# Patient Record
Sex: MALE | Race: WHITE
[De-identification: names, ages, dates, MRNs, and addresses within clinical notes are randomized per-mention and may not be internally consistent; named-entity substitution may affect disease eponyms.]

---

## 2018-09-03 ENCOUNTER — HOSPITAL ENCOUNTER (OUTPATIENT)
Dept: HOSPITAL 97 - ER | Age: 43
Setting detail: OBSERVATION
LOS: 1 days | Discharge: HOME | End: 2018-09-04
Attending: SURGERY | Admitting: SURGERY
Payer: SELF-PAY

## 2018-09-03 VITALS — BODY MASS INDEX: 35.5 KG/M2

## 2018-09-03 DIAGNOSIS — S01.81XA: Primary | ICD-10-CM

## 2018-09-03 DIAGNOSIS — Z88.0: ICD-10-CM

## 2018-09-03 DIAGNOSIS — M25.511: ICD-10-CM

## 2018-09-03 DIAGNOSIS — S06.0X0A: ICD-10-CM

## 2018-09-03 DIAGNOSIS — M79.652: ICD-10-CM
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
## 2018-09-03 DIAGNOSIS — Z23: ICD-10-CM

## 2018-09-03 LAB
ALBUMIN SERPL BCP-MCNC: 4 G/DL (ref 3.4–5)
ALP SERPL-CCNC: 78 U/L (ref 45–117)
ALT SERPL W P-5'-P-CCNC: 41 U/L (ref 12–78)
AST SERPL W P-5'-P-CCNC: 26 U/L (ref 15–37)
BUN BLD-MCNC: 26 MG/DL (ref 7–18)
CKMB CREATINE KINASE MB: 1.3 NG/ML (ref 0.3–3.6)
GLUCOSE SERPLBLD-MCNC: 111 MG/DL (ref 74–106)
HCT VFR BLD CALC: 42.6 % (ref 39.6–49)
INR BLD: 0.92
LYMPHOCYTES # SPEC AUTO: 4.1 K/UL (ref 0.7–4.9)
MAGNESIUM SERPL-MCNC: 2.3 MG/DL (ref 1.8–2.4)
MCH RBC QN AUTO: 32.5 PG (ref 27–35)
MCV RBC: 94.1 FL (ref 80–100)
METHAMPHET UR QL SCN: NEGATIVE
NT-PROBNP SERPL-MCNC: 63 PG/ML (ref ?–125)
PMV BLD: 9.1 FL (ref 7.6–11.3)
POTASSIUM SERPL-SCNC: 4.2 MMOL/L (ref 3.5–5.1)
RBC # BLD: 4.52 M/UL (ref 4.33–5.43)
THC SERPL-MCNC: NEGATIVE NG/ML
TROPONIN (EMERG DEPT USE ONLY): < 0.02 NG/ML (ref 0–0.04)

## 2018-09-03 PROCEDURE — 70450 CT HEAD/BRAIN W/O DYE: CPT

## 2018-09-03 PROCEDURE — 80061 LIPID PANEL: CPT

## 2018-09-03 PROCEDURE — 86850 RBC ANTIBODY SCREEN: CPT

## 2018-09-03 PROCEDURE — 82553 CREATINE MB FRACTION: CPT

## 2018-09-03 PROCEDURE — 93005 ELECTROCARDIOGRAM TRACING: CPT

## 2018-09-03 PROCEDURE — 96374 THER/PROPH/DIAG INJ IV PUSH: CPT

## 2018-09-03 PROCEDURE — 85610 PROTHROMBIN TIME: CPT

## 2018-09-03 PROCEDURE — 71045 X-RAY EXAM CHEST 1 VIEW: CPT

## 2018-09-03 PROCEDURE — 85730 THROMBOPLASTIN TIME PARTIAL: CPT

## 2018-09-03 PROCEDURE — 83735 ASSAY OF MAGNESIUM: CPT

## 2018-09-03 PROCEDURE — 82550 ASSAY OF CK (CPK): CPT

## 2018-09-03 PROCEDURE — 96361 HYDRATE IV INFUSION ADD-ON: CPT

## 2018-09-03 PROCEDURE — 71260 CT THORAX DX C+: CPT

## 2018-09-03 PROCEDURE — 99292 CRITICAL CARE ADDL 30 MIN: CPT

## 2018-09-03 PROCEDURE — 85025 COMPLETE CBC W/AUTO DIFF WBC: CPT

## 2018-09-03 PROCEDURE — 0JQ10ZZ REPAIR FACE SUBCUTANEOUS TISSUE AND FASCIA, OPEN APPROACH: ICD-10-PCS

## 2018-09-03 PROCEDURE — 72170 X-RAY EXAM OF PELVIS: CPT

## 2018-09-03 PROCEDURE — 36415 COLL VENOUS BLD VENIPUNCTURE: CPT

## 2018-09-03 PROCEDURE — 74177 CT ABD & PELVIS W/CONTRAST: CPT

## 2018-09-03 PROCEDURE — 70140 X-RAY EXAM OF FACIAL BONES: CPT

## 2018-09-03 PROCEDURE — 83880 ASSAY OF NATRIURETIC PEPTIDE: CPT

## 2018-09-03 PROCEDURE — 86901 BLOOD TYPING SEROLOGIC RH(D): CPT

## 2018-09-03 PROCEDURE — 81003 URINALYSIS AUTO W/O SCOPE: CPT

## 2018-09-03 PROCEDURE — 72125 CT NECK SPINE W/O DYE: CPT

## 2018-09-03 PROCEDURE — 80307 DRUG TEST PRSMV CHEM ANLYZR: CPT

## 2018-09-03 PROCEDURE — 99291 CRITICAL CARE FIRST HOUR: CPT

## 2018-09-03 PROCEDURE — 90714 TD VACC NO PRESV 7 YRS+ IM: CPT

## 2018-09-03 PROCEDURE — 80048 BASIC METABOLIC PNL TOTAL CA: CPT

## 2018-09-03 PROCEDURE — 84484 ASSAY OF TROPONIN QUANT: CPT

## 2018-09-03 PROCEDURE — 80320 DRUG SCREEN QUANTALCOHOLS: CPT

## 2018-09-03 PROCEDURE — 70553 MRI BRAIN STEM W/O & W/DYE: CPT

## 2018-09-03 PROCEDURE — 80076 HEPATIC FUNCTION PANEL: CPT

## 2018-09-03 PROCEDURE — 86900 BLOOD TYPING SEROLOGIC ABO: CPT

## 2018-09-03 RX ADMIN — Medication SCH ML: at 21:25

## 2018-09-03 RX ADMIN — HYDROCODONE BITARTRATE AND ACETAMINOPHEN PRN TAB: 5; 325 TABLET ORAL at 21:23

## 2018-09-03 RX ADMIN — SODIUM CHLORIDE, SODIUM LACTATE, POTASSIUM CHLORIDE, AND CALCIUM CHLORIDE SCH MLS: .6; .31; .03; .02 INJECTION, SOLUTION INTRAVENOUS at 17:27

## 2018-09-03 RX ADMIN — HYDROCODONE BITARTRATE AND ACETAMINOPHEN PRN TAB: 5; 325 TABLET ORAL at 17:27

## 2018-09-03 NOTE — RAD REPORT
EXAM DESCRIPTION:  RAD - Femur Left - 9/3/2018 3:16 pm

 

CLINICAL HISTORY:  Left leg pain

 

FINDINGS:  . No fracture is seen.
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
## 2018-09-03 NOTE — RAD REPORT
EXAM DESCRIPTION:  Rylee Single View9/3/2018 3:07 pm

 

CLINICAL HISTORY:  CHEST PAIN

 

COMPARISON:  none

 

FINDINGS:   The lungs appear clear of acute infiltrate. The heart is borderline enlarged

 

IMPRESSION:   No acute abnormalities displayed transfer from [facility]/Other

## 2018-09-03 NOTE — RAD REPORT
EXAM DESCRIPTION:  RAD - Shoulder  Right 2 View - 9/3/2018 3:09 pm

 

CLINICAL HISTORY:  Right shoulder pain

 

FINDINGS:  No fracture or dislocation is seen.

## 2018-09-03 NOTE — RAD REPORT
EXAM DESCRIPTION:

RAD - Facial Bones  <3 Views - 9/3/2018 5:03 pm

 

CLINICAL HISTORY:  check for any glass< facial pain

 

FINDINGS:  A 6 millimeter curvilinear density overlies the subcutaneous tissues of the right face. Th
is is equivocal for a foreign body

## 2018-09-03 NOTE — RAD REPORT
EXAM DESCRIPTION:  RAD - Pelvis - 9/3/2018 3:07 pm

 

CLINICAL HISTORY:  Pelvic pain status post injury

 

FINDINGS:  No fracture or dislocation is seen.

## 2018-09-03 NOTE — EDPHYS
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     

      right shoulder; Pain in left thigh. Bed requested for Telemetry/MedSurg (observation).      

      Status is Observation. Condition is Fair. Problem is new. Symptoms have improved. UTI       

      on Admission? No. em1                                                                       

16:55 16:55 2018 16:12 Hospitalization Ordered by Neil Alamo MD for Observation.       genevieve 

      Preliminary diagnosis is Laceration without foreign body of other part of head - skin       

      avulsiond, foreign bodies removed; Pain in right shoulder; Pain in left thigh. Bed          

      requested for Telemetry/MedSurg (observation). Status is Observation. Condition is          

      Fair. Problem is new. Symptoms have improved. UTI on Admission? No. genevieve                     

17:20 16:55 2018 16:12 Hospitalization Ordered by Neil Alamo MD for Observation.       sv  

      Preliminary diagnosis is Laceration without foreign body of other part of head - skin       

      avulsiond, foreign bodies removed; Pain in right shoulder; Pain in left thigh;              

      Concussion without loss of consciousness. Bed requested for Telemetry/MedSurg               

      (observation). Status is Observation. Condition is Fair. Problem is new. Symptoms have      

      improved. UTI on Admission? No. genevieve                                                         

                                                                                                  

**************************************************************************************************
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
## 2018-09-04 VITALS — OXYGEN SATURATION: 95 %

## 2018-09-04 VITALS — DIASTOLIC BLOOD PRESSURE: 78 MMHG | TEMPERATURE: 98.3 F | SYSTOLIC BLOOD PRESSURE: 146 MMHG

## 2018-09-04 LAB
HDLC SERPL-MCNC: 30 MG/DL (ref 40–60)
LDLC SERPL CALC-MCNC: 67 MG/DL (ref ?–130)

## 2018-09-04 RX ADMIN — SODIUM CHLORIDE, SODIUM LACTATE, POTASSIUM CHLORIDE, AND CALCIUM CHLORIDE SCH MLS: .6; .31; .03; .02 INJECTION, SOLUTION INTRAVENOUS at 03:10

## 2018-09-04 RX ADMIN — SODIUM CHLORIDE, SODIUM LACTATE, POTASSIUM CHLORIDE, AND CALCIUM CHLORIDE SCH: .6; .31; .03; .02 INJECTION, SOLUTION INTRAVENOUS at 08:00

## 2018-09-04 RX ADMIN — Medication SCH: at 09:00

## 2018-09-04 RX ADMIN — HYDROCODONE BITARTRATE AND ACETAMINOPHEN PRN TAB: 5; 325 TABLET ORAL at 03:07

## 2018-09-04 RX ADMIN — HYDROCODONE BITARTRATE AND ACETAMINOPHEN PRN TAB: 5; 325 TABLET ORAL at 11:52
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
## 2018-09-04 NOTE — RAD REPORT
EXAM DESCRIPTION:  MRI - Shoulder Rt Wo Cont - 9/4/2018 10:37 am

 

CLINICAL HISTORY:  unable to move rt arm

Trauma, shoulder pain

 

COMPARISON:  No comparisons

 

FINDINGS:  The AC joint demonstrates mild to moderate osseous and capsular hypertrophy. The acromion 
process is mildly concave within 8 mm acromial spur present.

 

The rotator cuff demonstrates a large full-thickness tear of the supraspinatus tendon measuring 17 mm
. Remnant tendon is all retracted but remains lateral to the AC joint. Significant partial-thickness 
tearing also noted involving the distal infraspinatus tendon and subscapularis tendon.

 

Edema is present surrounding the rotator cuff musculature. The biceps tendon lies within the bicipita
l groove.

 

The posterior labrum appears detached from the glenoid.

 

An acute fracture or dislocation is not seen. Floyd-Sachs lesion suspected.

 

Mild joint fluid is noted.

 

 

IMPRESSION:  Large rotator cuff tear is present. Fluid and edema is seen surrounding the rotator cuff
 musculature.

 

Detachment of the posterior labrum from the glenoid, age undetermined, but likely related to reverse 
Bankart lesion.

## 2018-09-04 NOTE — EKG
Test Date:    2018-09-03               Test Time:    15:44:42

Technician:   YARELI                                     

                                                     

MEASUREMENT RESULTS:                                       

Intervals:                                           

Rate:         76                                     

ND:           156                                    

QRSD:         96                                     

QT:           388                                    

QTc:          436                                    

Axis:                                                

P:            49                                     

ND:           156                                    

QRS:          -7                                     

T:            52                                     

                                                     

INTERPRETIVE STATEMENTS:                                       

                                                     

Normal sinus rhythm

Normal ECG

Compared to ECG 07/13/2015 15:25:07

No significant changes



Electronically Signed On 09-04-18 12:19:17 CDT by Sameer Marroquin

## 2018-09-12 ENCOUNTER — HOSPITAL ENCOUNTER (EMERGENCY)
Dept: HOSPITAL 97 - ER | Age: 43
Discharge: HOME | End: 2018-09-12
Payer: SELF-PAY

## 2018-09-12 VITALS — DIASTOLIC BLOOD PRESSURE: 78 MMHG | TEMPERATURE: 98.2 F | OXYGEN SATURATION: 99 % | SYSTOLIC BLOOD PRESSURE: 130 MMHG

## 2018-09-12 DIAGNOSIS — Z48.02: Primary | ICD-10-CM

## 2018-09-12 NOTE — ER
Nurse's Notes                                                                                     

 [facility]                                                                

Name: Roel Hatfield                                                                              

Age: 43 yrs                                                                                       

Sex: Male                                                                                         

: 1975                                                                                   

MRN: [id number]                                                                                   

Arrival Date: 2018                                                                          

Time: 15:27                                                                                       

Account#: [id number]                                                                            

Bed 12                                                                                            

Private MD: Kostas Salas T                                                                        

Diagnosis: Encounter for removal of sutures                                                       

                                                                                                  

Presentation:                                                                                     

                                                                                             

15:46 Presenting complaint: Patient states: suture removal, placed on 9/3/18. Transition of   sv  

      care: patient was not received from another setting of care. Onset of symptoms was          

      September 3, 2018. Risk Assessment: Do you want to hurt yourself or someone else?           

      Patient reports no desire to harm self or others. Initial Sepsis Screen: Does the           

      patient meet any 2 criteria? No. Patient's initial sepsis screen is negative. Does the      

      patient have a suspected source of infection? No. Patient's initial sepsis screen is        

      negative. Care prior to arrival: None.                                                      

15:46 Method Of Arrival: Ambulatory                                                           sv  

15:46 Acuity: BRADEN 5                                                                           sv  

                                                                                                  

Triage Assessment:                                                                                

15:47 General: Appears in no apparent distress. comfortable, well developed, Behavior is      sv  

      calm, cooperative, appropriate for age. Pain: Denies pain. EENT: No signs and/or            

      symptoms were reported regarding the EENT system. Neuro: Level of Consciousness is          

      awake, alert, obeys commands, Oriented to person, place, time, situation, Moves all         

      extremities. Full function Gait is steady, Speech is normal. Respiratory: Respiratory       

      effort is even, unlabored, Respiratory pattern is regular, symmetrical. Derm: Skin is       

      pink, warm \T\ dry. Injury Description: Laceration sustained to right forehead is 0.5 to    

      2.5 cm long, not bleeding, was sustained 9/3/18 no active bleeding noted at this time.      

                                                                                                  

Historical:                                                                                       

- Allergies:                                                                                      

15:47 Amoxicillin;                                                                            sv  

- PMHx:                                                                                           

15:47 Anxiety; Depression;                                                                    sv  

- PSHx:                                                                                           

15:47 left wrist; left foot;                                                                  sv  

                                                                                                  

- Immunization history:: Adult Immunizations up to date.                                          

- Social history:: Smoking status: Patient uses tobacco products, smokes one-half pack            

  cigarettes per day.                                                                             

- Ebola Screening: : No symptoms or risks identified at this time.                                

                                                                                                  

                                                                                                  

Screening:                                                                                        

15:48 Abuse screen: Denies threats or abuse. Denies injuries from another. Nutritional        sv  

      screening: No deficits noted. Tuberculosis screening: No symptoms or risk factors           

      identified. Fall Risk None identified.                                                      

                                                                                                  

Assessment:                                                                                       

15:48 Reassessment: Patient appears in no apparent distress at this time. No changes from       

      previously documented assessment. Patient and/or family updated on plan of care and         

      expected duration. Pain level reassessed. Patient is alert, oriented x 3, equal             

      unlabored respirations, skin warm/dry/pink.                                                 

                                                                                                  

Vital Signs:                                                                                      

15:47  / 78; Pulse 89; Resp 18; Temp 98.2; Pulse Ox 99% ;                               sv  

                                                                                                  

ED Course:                                                                                        

15:27 Patient arrived in ED.                                                                  sb2 

15:27 Kostas Salas MD is Private Physician.                                                   sb2 

15:42 Katlyn Norman FNP-C is [facility].                                                        kb  

15:42 En Palacios MD is Attending Physician.                                           kb  

15:46 Triage completed.                                                                       sv  

15:47 Arm band placed on right wrist.                                                         sv  

15:48 Patient has correct armband on for positive identification.                             sv  

15:48 Patient did not have IV access during this emergency room visit.                        sv  

15:49 Suture removal.                                                                         sv  

                                                                                                  

Administered Medications:                                                                         

No medications were administered                                                                  

                                                                                                  

                                                                                                  

Outcome:                                                                                          

15:49 Discharged to home ambulatory, with family.                                             sv  

15:49 Condition: stable                                                                           

15:49 No charge visit due to suture removal.                                                      

15:51 Discharge ordered by MD.                                                                kb  

16:03 Patient left the ED.                                                                      

                                                                                                  

Signatures:                                                                                       

Katlyn Norman FNP-C FNP-Ckb Verde, Stephanie, RN                    RN                                                      

Diana Obando RN                      RN                                                      

Sahara Henderson                               sb2                                                  

                                                                                                  

**************************************************************************************************

## 2018-09-12 NOTE — EDPHYS
Physician Documentation                                                                           

 [facility]                                                                

Name: Roel Hatfield                                                                              

Age: 43 yrs                                                                                       

Sex: Male                                                                                         

: 1975                                                                                   

MRN: [id number]                                                                                   

Arrival Date: 2018                                                                          

Time: 15:27                                                                                       

Account#: [id number]                                                                            

Bed 12                                                                                            

Private MD: Kostas Salas T                                                                        

ED Physician En Palacios                                                                    

HPI:                                                                                              

                                                                                             

15:52 This 43 yrs old  Male presents to ER via Ambulatory with complaints of Suture  kb  

      Removal.                                                                                    

15:52 The patient has sutures on the right temple and top of head. Previous treatment: The    kb  

      patient was initially treated on 2018, the care was rendered at [facility]. Sutures/staples progress: The patient has no c/o's. The wound       

      is well-healing with no redness, swelling, discharge, or dehiscence reported. The           

      patient has not experienced similar symptoms in the past. The patient has been recently     

      seen at the [facility] Emergency Department, a couple of weeks       

      ago.                                                                                        

                                                                                                  

Historical:                                                                                       

- Allergies:                                                                                      

15:47 Amoxicillin;                                                                            sv  

- PMHx:                                                                                           

15:47 Anxiety; Depression;                                                                    sv  

- PSHx:                                                                                           

15:47 left wrist; left foot;                                                                  sv  

                                                                                                  

- Immunization history:: Adult Immunizations up to date.                                          

- Social history:: Smoking status: Patient uses tobacco products, smokes one-half pack            

  cigarettes per day.                                                                             

- Ebola Screening: : No symptoms or risks identified at this time.                                

                                                                                                  

                                                                                                  

ROS:                                                                                              

15:51 Constitutional: Negative for fever, chills, and weight loss, Cardiovascular: Negative   kb  

      for chest pain, palpitations, and edema, Respiratory: Negative for shortness of breath,     

      cough, wheezing, and pleuritic chest pain, Abdomen/GI: Negative for abdominal pain,         

      nausea, vomiting, diarrhea, and constipation, Back: Negative for injury and pain, :       

      Negative for injury, bleeding, discharge, and swelling, MS/Extremity: Negative for          

      injury and deformity, Neuro: Negative for headache, weakness, numbness, tingling, and       

      seizure.                                                                                    

15:51 Skin: Positive for of the right temple and top of head, sutures in place.                   

                                                                                                  

Exam:                                                                                             

15:51 Constitutional:  This is a well developed, well nourished patient who is awake, alert,  kb  

      and in no acute distress. Head/Face:  Normocephalic, atraumatic. Chest/axilla:  Normal      

      chest wall appearance and motion.  Nontender with no deformity.  No lesions are             

      appreciated. Cardiovascular:  Regular rate and rhythm with a normal S1 and S2.  No          

      gallops, murmurs, or rubs.  Normal PMI, no JVD.  No pulse deficits. Respiratory:  Lungs     

      have equal breath sounds bilaterally, clear to auscultation and percussion.  No rales,      

      rhonchi or wheezes noted.  No increased work of breathing, no retractions or nasal          

      flaring. Abdomen/GI:  Soft, non-tender, with normal bowel sounds.  No distension or         

      tympany.  No guarding or rebound.  No evidence of tenderness throughout. MS/ Extremity:     

       Pulses equal, no cyanosis.  Neurovascular intact.  Full, normal range of motion.           

      Neuro:  Awake and alert, GCS 15, oriented to person, place, time, and situation.            

      Cranial nerves II-XII grossly intact.  Motor strength 5/5 in all extremities.  Sensory      

      grossly intact.  Cerebellar exam normal.  Normal gait.                                      

15:51 Skin: Wound recheck: Suture laceration closure: the wound is healing well, the edges        

      are well approximated, no evidence of dehiscence, no drainage, no erythema, no swelling.    

                                                                                                  

Vital Signs:                                                                                      

15:47  / 78; Pulse 89; Resp 18; Temp 98.2; Pulse Ox 99% ;                               sv  

                                                                                                  

Procedures:                                                                                       

15:50 Suture/Staple removal: Removed 10 sutures, from top of head and right temple, site      kb  

      appears well healed, Patient tolerated well.                                                

                                                                                                  

MDM:                                                                                              

15:50 Patient medically screened.                                                             kb  

15:50 Data reviewed: vital signs, nurses notes. Data interpreted: Pulse oximetry: on room air kb  

      is 99 %. Interpretation: normal. Counseling: I had a detailed discussion with the           

      patient and/or guardian regarding: the historical points, exam findings, and any            

      diagnostic results supporting the discharge/admit diagnosis, the need for outpatient        

      follow up, a family practitioner, to return to the emergency department if symptoms         

      worsen or persist or if there are any questions or concerns that arise at home.             

                                                                                                  

Administered Medications:                                                                         

No medications were administered                                                                  

                                                                                                  

                                                                                                  

Disposition:                                                                                      

18:47 Co-signature as Attending Physician, En Palacios MD.                               ma2 

                                                                                                  

Disposition:                                                                                      

18 15:51 Discharged to Home. Impression: Encounter for removal of sutures.                  

- Condition is Stable.                                                                            

- Discharge Instructions: Suture Removal, Care After.                                             

                                                                                                  

- Medication Reconciliation Form, Thank You Letter, Antibiotic Education, Prescription            

  Opioid Use form.                                                                                

- Follow up: Emergency Department; When: As needed; Reason: Worsening of condition.               

  Follow up: Private Physician; When: 2 - 3 days; Reason: Recheck today's complaints,             

  Continuance of care, Re-evaluation by your physician.                                           

                                                                                                  

                                                                                                  

                                                                                                  

Signatures:                                                                                       

Katlyn Norman, PAT DOMINGO-Zoila Osborn, RN                    RN                                                      

Diana Obando RN RN ss Alzahri, Mohammad, MD MD   ma2                                                  

                                                                                                  

Corrections: (The following items were deleted from the chart)                                    

16:03 15:51 2018 15:51 Discharged to Home. Impression: Encounter for removal of         ss  

      sutures. Condition is Stable. Forms are Medication Reconciliation Form, Thank You           

      Letter, Antibiotic Education, Prescription Opioid Use. Follow up: Emergency Department;     

      When: As needed; Reason: Worsening of condition. Follow up: Private Physician; When: 2      

      - 3 days; Reason: Recheck today's complaints, Continuance of care, Re-evaluation by         

      your physician. kb                                                                          

                                                                                                  

**************************************************************************************************